# Patient Record
Sex: MALE
[De-identification: names, ages, dates, MRNs, and addresses within clinical notes are randomized per-mention and may not be internally consistent; named-entity substitution may affect disease eponyms.]

---

## 2018-04-16 ENCOUNTER — HOSPITAL ENCOUNTER (EMERGENCY)
Dept: HOSPITAL 5 - ED | Age: 58
Discharge: HOME | End: 2018-04-16
Payer: COMMERCIAL

## 2018-04-16 VITALS — DIASTOLIC BLOOD PRESSURE: 70 MMHG | SYSTOLIC BLOOD PRESSURE: 116 MMHG

## 2018-04-16 DIAGNOSIS — R07.9: Primary | ICD-10-CM

## 2018-04-16 LAB
ALBUMIN SERPL-MCNC: 4.2 G/DL (ref 3.9–5)
ALT SERPL-CCNC: 16 UNITS/L (ref 7–56)
BASOPHILS # (AUTO): 0.1 K/MM3 (ref 0–0.1)
BASOPHILS NFR BLD AUTO: 0.5 % (ref 0–1.8)
BUN SERPL-MCNC: 22 MG/DL (ref 9–20)
BUN/CREAT SERPL: 20 %
CALCIUM SERPL-MCNC: 9.2 MG/DL (ref 8.4–10.2)
EOSINOPHIL # BLD AUTO: 0.3 K/MM3 (ref 0–0.4)
EOSINOPHIL NFR BLD AUTO: 2.2 % (ref 0–4.3)
HCT VFR BLD CALC: 42.9 % (ref 35.5–45.6)
HEMOLYSIS INDEX: 15
HGB BLD-MCNC: 14.1 GM/DL (ref 11.8–15.2)
LYMPHOCYTES # BLD AUTO: 1.4 K/MM3 (ref 1.2–5.4)
LYMPHOCYTES NFR BLD AUTO: 11.7 % (ref 13.4–35)
MCH RBC QN AUTO: 32 PG (ref 28–32)
MCHC RBC AUTO-ENTMCNC: 33 % (ref 32–34)
MCV RBC AUTO: 96 FL (ref 84–94)
MONOCYTES # (AUTO): 0.7 K/MM3 (ref 0–0.8)
MONOCYTES % (AUTO): 5.8 % (ref 0–7.3)
PLATELET # BLD: 175 K/MM3 (ref 140–440)
RBC # BLD AUTO: 4.47 M/MM3 (ref 3.65–5.03)

## 2018-04-16 PROCEDURE — 96360 HYDRATION IV INFUSION INIT: CPT

## 2018-04-16 PROCEDURE — 71045 X-RAY EXAM CHEST 1 VIEW: CPT

## 2018-04-16 PROCEDURE — 85025 COMPLETE CBC W/AUTO DIFF WBC: CPT

## 2018-04-16 PROCEDURE — 99284 EMERGENCY DEPT VISIT MOD MDM: CPT

## 2018-04-16 PROCEDURE — 36415 COLL VENOUS BLD VENIPUNCTURE: CPT

## 2018-04-16 PROCEDURE — 93005 ELECTROCARDIOGRAM TRACING: CPT

## 2018-04-16 PROCEDURE — 84484 ASSAY OF TROPONIN QUANT: CPT

## 2018-04-16 PROCEDURE — 93010 ELECTROCARDIOGRAM REPORT: CPT

## 2018-04-16 PROCEDURE — 80053 COMPREHEN METABOLIC PANEL: CPT

## 2018-04-16 NOTE — EMERGENCY DEPARTMENT REPORT
ED Chest Pain HPI





- General


Chief Complaint: Chest Pain


Stated Complaint: CHEST PAIN


Time Seen by Provider: 04/16/18 11:24


Source: patient, EMS


Mode of arrival: Stretcher


Limitations: No Limitations





- History of Present Illness


Initial Comments: 


 Presents with sudden onset left and rightPatient presents with left sided 

chest pain that radiates to the left arm.  Pain is constant, exertional, 

positional.  There are this pain before.  Improved with nitroglycerin..  It 

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  Mild 

shortness of breath associated with it.  Not exertional.  Nonpleuritic.  He 

took a nitroglycerin and 162 mg ASA which helped with the pain.  Symptoms feel 

similar to when he had his heart attack over a year ago.  Patient has history 

of 4 stents.  He's been compliant with his home medication.  Prior to this 

episode, patient developing normal self.  At time of ER presentation, the 

patient has no chest pain.





Severity scale (0 -10): 1





- Related Data


 Allergies











Allergy/AdvReac Type Severity Reaction Status Date / Time


 


No Known Allergies Allergy   Unverified 04/16/18 10:55














Heart Score





- HEART Score


History: Moderately suspicious


EKG: Normal


Age: 45-65


Risk factors: > 3 risk factors or hx of atherosclerotic disease


Troponin: < normal limit


HEART Score: 4





ED Review of Systems


ROS: 


Stated complaint: CHEST PAIN


Other details as noted in HPI





Comment: All other systems reviewed and negative


Respiratory: shortness of breath


Cardiovascular: chest pain





ED Past Medical Hx





- Past Medical History


Hx Hypertension: Yes


Hx Heart Attack/AMI: Yes (X6 Most recent 2017)





- Social History


Smoking Status: Current Every Day Smoker


Substance Use Type: Alcohol





ED Physical Exam





- General


Limitations: No Limitations


General appearance: alert, in no apparent distress





- Head


Head exam: Present: atraumatic, normocephalic





- Eye


Eye exam: Present: normal appearance





- ENT


ENT exam: Present: mucous membranes moist





- Neck


Neck exam: Present: normal inspection





- Respiratory


Respiratory exam: Present: normal lung sounds bilaterally.  Absent: respiratory 

distress





- Cardiovascular


Cardiovascular Exam: Present: regular rate, normal rhythm.  Absent: systolic 

murmur, diastolic murmur, rubs, gallop





- GI/Abdominal


GI/Abdominal exam: Present: soft





- Rectal


Rectal exam: Present: deferred





- Extremities Exam


Extremities exam: Present: normal inspection





- Back Exam


Back exam: Present: normal inspection





- Neurological Exam


Neurological exam: Present: alert, oriented X3





- Psychiatric


Psychiatric exam: Present: normal affect, normal mood





- Skin


Skin exam: Present: warm, dry, intact, normal color.  Absent: rash





ED Course


 Vital Signs











  04/16/18 04/16/18 04/16/18





  10:32 10:45 10:49


 


Temperature   97.8 F


 


Pulse Rate 57 L 55 L 57 L


 


Respiratory 12 14 18





Rate   


 


Blood Pressure  111/72 111/72


 


O2 Sat by Pulse 98 97 95





Oximetry   














  04/16/18 04/16/18 04/16/18





  11:00 11:31 12:00


 


Temperature   


 


Pulse Rate 56 L 52 L 48 L


 


Respiratory 15 13 18





Rate   


 


Blood Pressure 115/66 105/57 114/70


 


O2 Sat by Pulse 96 95 97





Oximetry   














  04/16/18





  12:18


 


Temperature 


 


Pulse Rate 56 L


 


Respiratory 





Rate 


 


Blood Pressure 


 


O2 Sat by Pulse 





Oximetry 














ED Medical Decision Making





- Lab Data


Result diagrams: 


 04/16/18 11:05





 04/16/18 11:05





- EKG Data


-: EKG Interpreted by Me


EKG shows normal: sinus rhythm, axis, intervals, QRS complexes, ST-T waves


Rate: bradycardia





- Radiology Data


Radiology results: report reviewed





- Medical Decision Making


 57-year-old male with multiple comorbidities and presents to the ER with chest 

pain, now resolved.  Patient is moderate risk for heart attack.  He is well-

appearing and presentation.  Vitals are stable.  Last significant for prerenal 

acidemia.  Patient states he does not take enough water.  Likely etiology is 

dehydration.  EKG is nonischemic.  Delta troponin was negative.  Discussed with 

patient about getting a cardiac stress test.  He says that he does not live 

here and that he would prefer to follow up with his family doctor back home.  

Patient is very familiar with this disease.  I thought that this was 

appropriate.  Return process haven't discussed.  He is given IV fluids for his 

dehydration in the ER.  Cleared for discharge.








- Differential Diagnosis


ACS, pericarditis, pneumothorax, pneumonia, costochondritis, muscle strain


Critical care attestation.: 


If time is entered above; I have spent that time in minutes in the direct care 

of this critically ill patient, excluding procedure time.








ED Disposition


Clinical Impression: 


 Chest pain





Disposition: DC-01 TO HOME OR SELFCARE


Is pt being admited?: No


Does the pt Need Aspirin: No


Condition: Stable


Instructions:  Chest Pain (ED)


Additional Instructions: 


Please talk with your family doctor about getting a cardiac stress test for 

further workup of your chest pain.


Referrals: 


PRIMARY CARE,MD [Primary Care Provider] - 3-5 Days